# Patient Record
Sex: MALE | Race: WHITE
[De-identification: names, ages, dates, MRNs, and addresses within clinical notes are randomized per-mention and may not be internally consistent; named-entity substitution may affect disease eponyms.]

---

## 2020-12-03 ENCOUNTER — HOSPITAL ENCOUNTER (EMERGENCY)
Dept: HOSPITAL 26 - MED | Age: 35
Discharge: HOME | End: 2020-12-03
Payer: SELF-PAY

## 2020-12-03 VITALS — HEIGHT: 63 IN | BODY MASS INDEX: 28 KG/M2 | WEIGHT: 158 LBS

## 2020-12-03 VITALS — DIASTOLIC BLOOD PRESSURE: 84 MMHG | SYSTOLIC BLOOD PRESSURE: 128 MMHG

## 2020-12-03 VITALS — SYSTOLIC BLOOD PRESSURE: 136 MMHG | DIASTOLIC BLOOD PRESSURE: 81 MMHG

## 2020-12-03 DIAGNOSIS — Z90.49: ICD-10-CM

## 2020-12-03 DIAGNOSIS — F41.9: ICD-10-CM

## 2020-12-03 DIAGNOSIS — G47.00: Primary | ICD-10-CM

## 2020-12-03 NOTE — NUR
Patient discharged with v/s stable. Written and verbal after care instructions 
given and explained. 

Patient alert, oriented and verbalized understanding of instructions. 
Ambulatory with steady gait. All questions addressed prior to discharge. ID 
band removed. Patient advised to follow up with PMD. Rx of diphenhydramine 
Hydrochloriode 25mg given. Patient educated on indication of medication 
including possible reaction and side effects. Opportunity to ask questions 
provided and answered.

## 2020-12-03 NOTE — NUR
34 y/o male from home tested + for covid 2 wks ago states he has episodes of 
anxiety. Pt states he is having difficulty sleeping at night due to feelings of 
"convulsions"/shaking. RR even and unlabored. Pt afebrile at this time. Denies 
pain. VSS 



medhx: denies